# Patient Record
Sex: FEMALE | Race: OTHER | HISPANIC OR LATINO | URBAN - METROPOLITAN AREA
[De-identification: names, ages, dates, MRNs, and addresses within clinical notes are randomized per-mention and may not be internally consistent; named-entity substitution may affect disease eponyms.]

---

## 2023-01-21 ENCOUNTER — EMERGENCY (EMERGENCY)
Facility: HOSPITAL | Age: 23
LOS: 1 days | Discharge: HOME | End: 2023-01-21
Attending: EMERGENCY MEDICINE | Admitting: EMERGENCY MEDICINE
Payer: COMMERCIAL

## 2023-01-21 VITALS
HEART RATE: 94 BPM | TEMPERATURE: 98 F | RESPIRATION RATE: 18 BRPM | DIASTOLIC BLOOD PRESSURE: 100 MMHG | SYSTOLIC BLOOD PRESSURE: 182 MMHG | OXYGEN SATURATION: 100 %

## 2023-01-21 DIAGNOSIS — I10 ESSENTIAL (PRIMARY) HYPERTENSION: ICD-10-CM

## 2023-01-21 DIAGNOSIS — E03.8 OTHER SPECIFIED HYPOTHYROIDISM: ICD-10-CM

## 2023-01-21 LAB
ALBUMIN SERPL ELPH-MCNC: 4.6 G/DL — SIGNIFICANT CHANGE UP (ref 3.3–5)
ALP SERPL-CCNC: 124 U/L — HIGH (ref 40–120)
ALT FLD-CCNC: 13 U/L — SIGNIFICANT CHANGE UP (ref 4–33)
ANION GAP SERPL CALC-SCNC: 20 MMOL/L — HIGH (ref 7–14)
AST SERPL-CCNC: 27 U/L — SIGNIFICANT CHANGE UP (ref 4–32)
BASOPHILS # BLD AUTO: 0.05 K/UL — SIGNIFICANT CHANGE UP (ref 0–0.2)
BASOPHILS NFR BLD AUTO: 0.5 % — SIGNIFICANT CHANGE UP (ref 0–2)
BILIRUB SERPL-MCNC: <0.2 MG/DL — SIGNIFICANT CHANGE UP (ref 0.2–1.2)
BUN SERPL-MCNC: 14 MG/DL — SIGNIFICANT CHANGE UP (ref 7–23)
CALCIUM SERPL-MCNC: 9.8 MG/DL — SIGNIFICANT CHANGE UP (ref 8.4–10.5)
CHLORIDE SERPL-SCNC: 104 MMOL/L — SIGNIFICANT CHANGE UP (ref 98–107)
CO2 SERPL-SCNC: 14 MMOL/L — LOW (ref 22–31)
CREAT SERPL-MCNC: 0.52 MG/DL — SIGNIFICANT CHANGE UP (ref 0.5–1.3)
D DIMER BLD IA.RAPID-MCNC: <150 NG/ML DDU — SIGNIFICANT CHANGE UP
EGFR: 134 ML/MIN/1.73M2 — SIGNIFICANT CHANGE UP
EOSINOPHIL # BLD AUTO: 0.13 K/UL — SIGNIFICANT CHANGE UP (ref 0–0.5)
EOSINOPHIL NFR BLD AUTO: 1.4 % — SIGNIFICANT CHANGE UP (ref 0–6)
FLUAV AG NPH QL: SIGNIFICANT CHANGE UP
FLUBV AG NPH QL: SIGNIFICANT CHANGE UP
GLUCOSE SERPL-MCNC: 100 MG/DL — HIGH (ref 70–99)
HCG SERPL-ACNC: <5 MIU/ML — SIGNIFICANT CHANGE UP
HCT VFR BLD CALC: 43 % — SIGNIFICANT CHANGE UP (ref 34.5–45)
HGB BLD-MCNC: 14 G/DL — SIGNIFICANT CHANGE UP (ref 11.5–15.5)
IANC: 5.91 K/UL — SIGNIFICANT CHANGE UP (ref 1.8–7.4)
IMM GRANULOCYTES NFR BLD AUTO: 0.5 % — SIGNIFICANT CHANGE UP (ref 0–0.9)
LYMPHOCYTES # BLD AUTO: 2.45 K/UL — SIGNIFICANT CHANGE UP (ref 1–3.3)
LYMPHOCYTES # BLD AUTO: 26.8 % — SIGNIFICANT CHANGE UP (ref 13–44)
MCHC RBC-ENTMCNC: 27.4 PG — SIGNIFICANT CHANGE UP (ref 27–34)
MCHC RBC-ENTMCNC: 32.6 GM/DL — SIGNIFICANT CHANGE UP (ref 32–36)
MCV RBC AUTO: 84.1 FL — SIGNIFICANT CHANGE UP (ref 80–100)
MONOCYTES # BLD AUTO: 0.55 K/UL — SIGNIFICANT CHANGE UP (ref 0–0.9)
MONOCYTES NFR BLD AUTO: 6 % — SIGNIFICANT CHANGE UP (ref 2–14)
NEUTROPHILS # BLD AUTO: 5.91 K/UL — SIGNIFICANT CHANGE UP (ref 1.8–7.4)
NEUTROPHILS NFR BLD AUTO: 64.8 % — SIGNIFICANT CHANGE UP (ref 43–77)
NRBC # BLD: 0 /100 WBCS — SIGNIFICANT CHANGE UP (ref 0–0)
NRBC # FLD: 0 K/UL — SIGNIFICANT CHANGE UP (ref 0–0)
PLATELET # BLD AUTO: 358 K/UL — SIGNIFICANT CHANGE UP (ref 150–400)
POTASSIUM SERPL-MCNC: 4.7 MMOL/L — SIGNIFICANT CHANGE UP (ref 3.5–5.3)
POTASSIUM SERPL-SCNC: 4.7 MMOL/L — SIGNIFICANT CHANGE UP (ref 3.5–5.3)
PROT SERPL-MCNC: 8.6 G/DL — HIGH (ref 6–8.3)
RBC # BLD: 5.11 M/UL — SIGNIFICANT CHANGE UP (ref 3.8–5.2)
RBC # FLD: 12.5 % — SIGNIFICANT CHANGE UP (ref 10.3–14.5)
RSV RNA NPH QL NAA+NON-PROBE: SIGNIFICANT CHANGE UP
SARS-COV-2 RNA SPEC QL NAA+PROBE: SIGNIFICANT CHANGE UP
SODIUM SERPL-SCNC: 138 MMOL/L — SIGNIFICANT CHANGE UP (ref 135–145)
TSH SERPL-MCNC: 6.05 UIU/ML — HIGH (ref 0.27–4.2)
WBC # BLD: 9.14 K/UL — SIGNIFICANT CHANGE UP (ref 3.8–10.5)
WBC # FLD AUTO: 9.14 K/UL — SIGNIFICANT CHANGE UP (ref 3.8–10.5)

## 2023-01-21 PROCEDURE — 99204 OFFICE O/P NEW MOD 45 MIN: CPT

## 2023-01-21 PROCEDURE — 99223 1ST HOSP IP/OBS HIGH 75: CPT

## 2023-01-21 PROCEDURE — 71046 X-RAY EXAM CHEST 2 VIEWS: CPT | Mod: 26

## 2023-01-21 RX ORDER — SODIUM CHLORIDE 9 MG/ML
1000 INJECTION INTRAMUSCULAR; INTRAVENOUS; SUBCUTANEOUS ONCE
Refills: 0 | Status: COMPLETED | OUTPATIENT
Start: 2023-01-21 | End: 2023-01-21

## 2023-01-21 RX ORDER — ATENOLOL 25 MG/1
25 TABLET ORAL ONCE
Refills: 0 | Status: COMPLETED | OUTPATIENT
Start: 2023-01-21 | End: 2023-01-21

## 2023-01-21 RX ORDER — ALBUTEROL 90 UG/1
2.5 AEROSOL, METERED ORAL ONCE
Refills: 0 | Status: COMPLETED | OUTPATIENT
Start: 2023-01-21 | End: 2023-01-21

## 2023-01-21 RX ORDER — DIAZEPAM 5 MG
5 TABLET ORAL ONCE
Refills: 0 | Status: DISCONTINUED | OUTPATIENT
Start: 2023-01-21 | End: 2023-01-21

## 2023-01-21 RX ADMIN — SODIUM CHLORIDE 1000 MILLILITER(S): 9 INJECTION INTRAMUSCULAR; INTRAVENOUS; SUBCUTANEOUS at 07:47

## 2023-01-21 RX ADMIN — ALBUTEROL 2.5 MILLIGRAM(S): 90 AEROSOL, METERED ORAL at 07:47

## 2023-01-21 RX ADMIN — Medication 5 MILLIGRAM(S): at 11:34

## 2023-01-21 RX ADMIN — ATENOLOL 25 MILLIGRAM(S): 25 TABLET ORAL at 11:34

## 2023-01-21 NOTE — CONSULT NOTE ADULT - ASSESSMENT
A/P: 23 yo F with no known PMH with presentation above. Endocrine called regarding HTN and abnormal TFTs.    #HTN: in young woman, no PMH prior, on OCP. +fam hx in father  - team ordering plasma metanephrines - can follow up outpatient, was given benzo will likely need to be repeated if abnormal  - can also check aldosterone and plasma renin activity  - no hypokalemia  - low suspicion for endocrinopathy based on available data but can follow up with endocrinology and PCP as outpatient  - pcp for HTN monitoring, consider sending BP cuff on discharge for home monitoring for pt to record    #subclinical hypothyroidism  - can check TPO antibody, can follow up as outpt  - no indication to treat at this time  - repeat TFTs as outpatient    Pt can follow up with endocrine  provided pcp contact as well    [facility]:  [address] [place] [address]. [address]. [place] [address]  Tel: [phone]      PATIENT ACCESS SERVICES  [phone]  For all [facility] Endocrine practices phone numbers and locations throughout [place], [place], and [place].    discussed with cdu  endocrine will sign off    Lily Hall MD  Attending Physician   Department of Endocrinology, Diabetes and Metabolism   [employer] Teams on 01-21-23 @ 15:49    If before 9AM or after 5PM, or on weekends/holidays, please call the Endocrine answering service for assistance ([phone]).  For nonurgent matters, please email [email or web address] for assistance.

## 2023-01-21 NOTE — ED ADULT TRIAGE NOTE - CHIEF COMPLAINT QUOTE
c/o SOB, chest tightness, and palpitations since 2am. pt arrives upset/in tears, endorses tingling in fingers. NO PMHx

## 2023-01-21 NOTE — ED CDU PROVIDER INITIAL DAY NOTE - CLINICAL SUMMARY MEDICAL DECISION MAKING FREE TEXT BOX
22-year-old female with no significant past medical history presents emergency room for shortness of breath and palpitations.  Patient reports sudden onset of difficulty breathing and heart racing that began at 2 AM.  Denies any alcohol, smoking or drug use prior to symptom onset.  States that she does have some anxiety but denies any particular triggers.  Denies fever, chills, URI symptoms, cough, chest pain, abdominal pain, nausea, vomiting, diarrhea.  Patient reports some numbness and tingling in her fingers at that time but denies the symptoms now.  In the ER, patient found to be tachycardic into the 120s and hypertensive systolic of 180s.  CBC unremarkable, D-dimer negative, CMP unremarkable, hCG negative, TSH found to be elevated, T3 also elevated, T4 and free T4 are normal.  Viral swab negative chest x-ray clear.  EKG was normal sinus rhythm at 99 bpm without ischemic changes.  Given Atenolol and Valium in ER, HR in the 70s, BP systolic of 150s now. Plan for CDU for telemetric monitoring, EP consultation for possible causes of tachycardia and renal artery ultrasound for evaluation of hypertension.

## 2023-01-21 NOTE — ED CDU PROVIDER INITIAL DAY NOTE - NS ED ATTENDING STATEMENT MOD
This was a shared visit with the TIFFANI. I reviewed and verified the documentation and independently performed the documented:

## 2023-01-21 NOTE — ED CDU PROVIDER INITIAL DAY NOTE - OBJECTIVE STATEMENT
22-year-old female with no significant past medical history presents emergency room for shortness of breath and palpitations.  Patient reports sudden onset of difficulty breathing and heart racing that began at 2 AM.  Denies any alcohol, smoking or drug use prior to symptom onset.  States that she does have some anxiety but denies any particular triggers.  Denies fever, chills, URI symptoms, cough, chest pain, abdominal pain, nausea, vomiting, diarrhea.  Patient reports some numbness and tingling in her fingers at that time but denies the symptoms now.  In the ER, patient found to be tachycardic into the 120s and hypertensive systolic of 180s.  CBC unremarkable, D-dimer negative, CMP unremarkable, hCG negative, TSH found to be elevated, T3 also elevated, T4 and free T4 are normal.  Viral swab negative chest x-ray clear.  EKG was normal sinus rhythm at 99 bpm without ischemic changes. Patient reports improvement of symptoms overall but still mild shortness of breath/difficulty catching her breath.

## 2023-01-21 NOTE — ED PROVIDER NOTE - OBJECTIVE STATEMENT
Tracey Robledo MD attending physician.Patient is a 22-year-old woman otherwise healthy denies any past medical history works for [employer].  She had a sudden onset of shortness of breath and palpitations last night at about 2 AM.  She denies any weight loss tremor any alcohol last night or smoking.  She denies any other medical problems although has not been to a doctor in a long time.  She uses birth control but does not get her menses.  So she is unclear when she would have last ovulated.  She denies abdominal pain nausea vomiting diarrhea URI symptoms.  She denies fever.  She had a sense of palpitations that she still has.  At this moment her EKG is normal sinus rhythm rate of 99 without any short MS long QT or Brugada signs.  When I listen to her heart her heart rate beba to 122.  She also had a sense of numbness and tingling in her fingers although no perioral sensation.  No numbness and tingling in her toes.  Her partner acknowledges that baseline she may have a slightly high anxiety level.  She does not identify any particular triggers.  She denies a history of asthma or thyroid disease.  She denies baseline alcohol abuse or history of withdrawal

## 2023-01-21 NOTE — ED ADULT NURSE NOTE - OBJECTIVE STATEMENT
Patient came in with the complaints of chest tightness started 2 am this morning. No other complaints. Specimens collected and sent. Medications given as ordered. Patient tolerated well. Nursing care continues

## 2023-01-21 NOTE — ED PROVIDER NOTE - PROGRESS NOTE DETAILS
pt on monitor in hallway.  hr consistently near or about 100, rate in 120's much of the tuime, even after hydration.  also note bp consistently elevated.  d dimr normal  less likely PE but vs abnl, endocrine paged.  pulse up to 140 despite hydration, sinus,  tsh elevated,  will stay in obs, given atenalol and valium pt hypothyroid, does not explain tachycardia htn.  would send metanephrine, raheem, renin, and get renal U\artery US, and have EP eval pt in OBS, consider for arranging holter, etc

## 2023-01-21 NOTE — ED PROVIDER NOTE - PHYSICAL EXAMINATION
Pt alert and can phonate well  h at/nc  perrl, conj clear, sclera anicteric,  neck supple  cor rrr pos s1s2, rate 120 on asc  lungs clear to asno wheeze  abd soft no r/g/t  ext no edema no deformities  neueo awake, lucid normal gait moves all extremities with strength  psych mildly anxious  vs bp 180/100

## 2023-01-21 NOTE — ED PROVIDER NOTE - EKG ADDITIONAL INFORMATION FREE TEXT
At this moment her EKG is normal sinus rhythm rate of 99 without any short CA long QT or Brugada signs.  No signs of ischemia

## 2023-01-21 NOTE — ED PROVIDER NOTE - CLINICAL SUMMARY MEDICAL DECISION MAKING FREE TEXT BOX
Tracey Robledo MD attending physician.  See my H&P above.  Patient with sudden onset of tachycardia and shortness of breath that she identifies at 2:00 last night.  Has a difficult and stressful job but does not note any additional stressors.  Partner agrees that she has baseline stress.  Denies weight loss tremor but considering hyperthyroidism.  Had a recent trip to [place] so considering pulmonary embolism although now not tachycardic.  We do a D-dimer and chest x-ray to evaluate for pulmonary issues.  We will treat with albuterol once and see if it improves though the patient is not wheezing.  There is a very low probability that she has SVT as she currently feels symptoms even though her heart rate is currently normal.  Of note her blood pressure was 180/100 on presentation.  We will recheck that as well.

## 2023-01-21 NOTE — CONSULT NOTE ADULT - SUBJECTIVE AND OBJECTIVE BOX
HPI:  · HPI Objective Statement: 22-year-old female with no significant past medical history presents emergency room for shortness of breath and palpitations.  Patient reports sudden onset of difficulty breathing and heart racing that began at 2 AM.  Denies any alcohol, smoking or drug use prior to symptom onset.  States that she does have some anxiety but denies any particular triggers.  Denies fever, chills, URI symptoms, cough, chest pain, abdominal pain, nausea, vomiting, diarrhea.  Patient reports some numbness and tingling in her fingers at that time but denies the symptoms now.  In the ER, patient found to be tachycardic into the 120s and hypertensive systolic of 180s.  CBC unremarkable, D-dimer negative, CMP unremarkable, hCG negative, TSH found to be elevated, T3 also elevated, T4 and free T4 are normal.  Viral swab negative chest x-ray clear.  EKG was normal sinus rhythm at 99 bpm without ischemic changes. Patient reports improvement of symptoms overall but still mild shortness of breath/difficulty catching her breath.    ENDOCRINE CONSULT  hpi: 21 yo F with no known PMH with presentation above. Endocrine called regarding HTN and abnormal TFTs.    Pt denies known prior history of HTN or thyroid disease. Reports years of palpitations, possibly worse in last year, associated with SOB and headaches with migrainous features - sometimes with photophobia and lasting hours. She is unable to correlate the two symptoms and is unsure if they occur concurrently. Feels like she gets hot flashes. She does not taking any meds besides an OCP. Reports fam hx of HTN in her father, CVA in older family members. She reports her diet is poor and she has a stressful job with [employer].    TSH 6.05  FT4 normal 1.4    s/p diazepam in ED and atenolol    systolic bps up to 180s per chart, HR 120s   pt appearing clinically well at time of visit but very tearful      PAST MEDICAL & SURGICAL HISTORY:  No pertinent past medical history          FAMILY HISTORY:  + HTN in father    Social History:  denies tobacco, etoh or drug use    Outpatient Medications: OCP, unsure of name    MEDICATIONS  (STANDING):    MEDICATIONS  (PRN):      Allergies    No Known Allergies    Intolerances      Review of Systems:  Constitutional: No fever, good appetite/po intake  Eyes: No blurry vision, diplopia  Neuro: No tremors  HEENT: No pain  Cardiovascular: No chest pain, palpitations  Respiratory: No SOB, no cough  GI: No nausea, vomiting,   : No dysuria, hematuria  Skin: no rash  Psych: no depression  Endocrine: no polyuria, polydipsia  Hem/lymph: no swelling  Osteoporosis: no fractures    ALL OTHER SYSTEMS REVIEWED AND NEGATIVE    PHYSICAL EXAM:  VITALS: T(C): 36.9 (01-21-23 @ 13:45)  T(F): 98.5 (01-21-23 @ 13:45), Max: 98.9 (01-21-23 @ 13:09)  HR: 74 (01-21-23 @ 13:45) (74 - 94)  BP: 159/99 (01-21-23 @ 13:45) (132/92 - 182/100)  RR:  (18 - 20)  SpO2:  (98% - 100%)  Wt(kg): --  GENERAL: NAD, well-groomed, well-developed  EYES: No proptosis, no lid lag, anicteric  HEENT:  Atraumatic, Normocephalic, moist mucous membranes  THYROID: Normal size, no palpable nodules  RESPIRATORY: Clear to auscultation bilaterally; No rales, rhonchi, wheezing, or rubs  CARDIOVASCULAR: Regular rate and rhythm; No murmurs; no peripheral edema  GI: Soft, nontender, non distended, normal bowel sounds  SKIN: Dry, intact, No rashes or lesions  NEURO: sensation intact, extraocular movements intact, no tremor, normal reflexes  PSYCH: reactive affect, euthymic mood, tearful    POCT Blood Glucose.: 100 mg/dL (01-21-23 @ 13:43)                            14.0   9.14  )-----------( 358      ( 21 Jan 2023 08:00 )             43.0       01-21    138  |  104  |  14  ----------------------------<  100<H>  4.7   |  14<L>  |  0.52    eGFR: 134    Ca    9.8      01-21    TPro  8.6<H>  /  Alb  4.6  /  TBili  <0.2  /  DBili  x   /  AST  27  /  ALT  13  /  AlkPhos  124<H>  01-21      Thyroid Function Tests:  01-21 @ 08:00 TSH 6.05 FreeT4 1.4 T3 235 Anti TPO -- Anti Thyroglobulin Ab -- TSI --              Radiology:

## 2023-01-21 NOTE — ED CDU PROVIDER INITIAL DAY NOTE - ATTENDING APP SHARED VISIT CONTRIBUTION OF CARE
Tracey Robledo MD attending physician.  See my H&P above.  Patient with sudden onset of tachycardia and shortness of breath that she identifies at 2:00 last night.  Has a difficult and stressful job but does not note any additional stressors.  Partner agrees that she has baseline stress.  Denies weight loss tremor but considering hyperthyroidism.  Had a recent trip to [place] so considering pulmonary embolism although now not tachycardic.  We do a D-dimer and chest x-ray to evaluate for pulmonary issues.  We will treat with albuterol once and see if it improves though the patient is not wheezing.   though her heart rate is currently normal.  Of note her blood pressure was 180/100 on presentation.  We will recheck that as well.    d dimer was neg, pt with persistent htn, tachycardia, sending for metanephrines, renin and raheem, but will keep in cdu for eval for ep/ cards, hr consistently in 120's-140's even post hydration.  given bblocker and valium here    I performed a history and physical exam of the patient and discussed their management with the resident and /or advanced care provider. I reviewed the resident and /or ACP's note and agree with the documented findings and plan of care. My medical decison making and observations are found above.

## 2023-01-22 VITALS
SYSTOLIC BLOOD PRESSURE: 131 MMHG | TEMPERATURE: 98 F | DIASTOLIC BLOOD PRESSURE: 75 MMHG | OXYGEN SATURATION: 100 % | RESPIRATION RATE: 18 BRPM | HEART RATE: 88 BPM

## 2023-01-22 PROCEDURE — 99239 HOSP IP/OBS DSCHRG MGMT >30: CPT

## 2023-01-22 PROCEDURE — 93306 TTE W/DOPPLER COMPLETE: CPT | Mod: 26

## 2023-01-22 NOTE — ED CDU PROVIDER DISPOSITION NOTE - NSFOLLOWUPINSTRUCTIONS_ED_ALL_ED_FT
Follow up with cardiology (referral list provided or you may call [phone] to schedule an appointment ASAP. Most patients can be seen within 48 hours. Mention that you were just discharged from the emergency room and need to be seen immediately.; or you may call [phone] to make an appointment with the cardiology clinic)    Follow up with endocrinology (call [phone] to make an appointment) as soon as possible.    Follow up with your primary care physician in 48-72 hours- bring copies of your results.  Return to the ER for worsening or persistent symptoms, and/or ANY NEW OR CONCERNING SYMPTOMS: worsening palpitations, fever, difficulty breathing, chest pain. If you have issues obtaining follow up, please call: [phone] to obtain a doctor or specialist who takes your insurance in your area.  You may call [phone] to make an appointment with the internal medicine clinic.

## 2023-01-22 NOTE — ED CDU PROVIDER DISPOSITION NOTE - PATIENT PORTAL LINK FT
You can access the FollowMyHealth Patient Portal offered by [facility] by registering at the following website: [email or web address]. By joining [facility]s FollowMyHealth portal, you will also be able to view your health information using other applications (apps) compatible with our system.

## 2023-01-22 NOTE — ED CDU PROVIDER DISPOSITION NOTE - NSFOLLOWUPCLINICS_GEN_ALL_ED_FT
[facility]  Endocrinology  [address] [place] [address]  Phone: [phone]  Fax:     [facility]  Cardiology  [address] [place] [id number]  Phone: [phone]  Fax:

## 2023-01-22 NOTE — ED CDU PROVIDER SUBSEQUENT DAY NOTE - HISTORY
23 yo female, no stated PMH, presented to the ED c/o shortness of breath and palpitations, onset 1/21/23 at 0200 AM; also had b/l UE hand paresthesias at the time.  Pt denied any alcohol, smoking or drug use prior to symptom onset; stated that she does have some anxiety but denies any particular triggers.  ROS otherwise negative.  In the ED, patient was (sinus) tachycardic into the 120s and hypertensive systolic of 180s.  CBC unremarkable, D-dimer negative, CMP unremarkable, hCG negative, TSH found to be elevated, T3 also elevated, T4 and free T4 are normal.  Viral swab negative chest x-ray clear.  EKG later was normal sinus rhythm at 99 bpm without ischemic changes.  Given Atenolol and Valium in ER, VS improved: HR in the 70s, BP systolic of 150s. Plan for CDU for telemetry monitoring, EP consultation for possible causes of tachycardia and renal artery ultrasound for evaluation of hypertension.  Echo ordered as well.  In the interim, pt objectively noted to be resting comfortably; pt has been clinically stable; no issues thus far.  Current HR NSR upper 60's - low 70's on cardiac monitor.

## 2023-01-22 NOTE — ED CDU PROVIDER SUBSEQUENT DAY NOTE - ATTENDING APP SHARED VISIT CONTRIBUTION OF CARE
CDU MD YODER:  I performed a face to face bedside interview with patient regarding history of present illness, review of symptoms and past medical history. I completed an independent physical exam.  I have discussed patient's plan of care with PA.   I agree with note as stated above, having amended the EMR as needed to reflect my findings. I have discussed the assessment and plan of care.  This includes during the time I functioned as the attending physician for this patient.

## 2023-01-22 NOTE — ED CDU PROVIDER SUBSEQUENT DAY NOTE - CLINICAL SUMMARY MEDICAL DECISION MAKING FREE TEXT BOX
Tele monitoring, EP consultation, renal artery ultrasound, echo, general observation care / monitoring.

## 2023-01-22 NOTE — ED CDU PROVIDER DISPOSITION NOTE - CLINICAL COURSE
21 yo female, no stated PMH, presented to the ED c/o shortness of breath and palpitations, onset 1/21/23 at 0200 AM; also had b/l UE hand paresthesias at the time.  Pt denied any alcohol, smoking or drug use prior to symptom onset; stated that she does have some anxiety but denies any particular triggers.  ROS otherwise negative.  In the ED, patient was (sinus) tachycardic into the 120s and hypertensive systolic of 180s.  CBC unremarkable, D-dimer negative, CMP unremarkable, hCG negative, TSH found to be elevated, T3 also elevated, T4 and free T4 are normal.  Viral swab negative chest x-ray clear.  EKG later was normal sinus rhythm at 99 bpm without ischemic changes.  Given Atenolol and Valium in ER, VS improved: HR in the 70s, BP systolic of 150s. Plan for CDU for telemetry monitoring, EP consultation for possible causes of tachycardia and renal artery ultrasound for evaluation of hypertension.  Echo ordered as well.  In the interim, pt objectively noted to be resting comfortably; pt has been clinically stable; no issues thus far.  Current HR NSR upper 60's - low 70's on cardiac monitor.  re-evaluated pt in the morning, pt resting comfortably in stretcher. echo normal. bp 120-130/80-90 without medications here in the CDU. discussed with house cards in the morning who states that no need for emergent consult at this time and have pt f/u outpatient.   pt did not want to wait for the US of renal artery, and states that she will follow up with PCP and endo and cards outpatient.

## 2023-01-22 NOTE — ED CDU PROVIDER SUBSEQUENT DAY NOTE - PROGRESS NOTE DETAILS
Pt resting comfortably.  No complaints.  No cp or palpitations.  BP wnl.  Pending u/s studies and ep eval.  Pt agrees with plan. re-evaluated pt in the morning, pt resting comfortably in stretcher. echo normal. bp 120-130/80-90 without medications here in the CDU. discussed with house cards in the morning who states that no need for emergent consult at this time and have pt f/u outpatient.   pt did not want to wait for the US of renal artery, and states that she will follow up with PCP and endo and cards outpatient.

## 2023-01-23 ENCOUNTER — NON-APPOINTMENT (OUTPATIENT)
Age: 23
End: 2023-01-23

## 2023-01-23 PROBLEM — Z78.9 OTHER SPECIFIED HEALTH STATUS: Chronic | Status: ACTIVE | Noted: 2023-01-21

## 2023-01-23 PROBLEM — Z00.00 ENCOUNTER FOR PREVENTIVE HEALTH EXAMINATION: Status: ACTIVE | Noted: 2023-01-23

## 2023-01-23 LAB — ALDOST SERPL-MCNC: 5.5 NG/DL — SIGNIFICANT CHANGE UP

## 2023-01-25 ENCOUNTER — TRANSCRIPTION ENCOUNTER (OUTPATIENT)
Age: 23
End: 2023-01-25

## 2023-01-26 ENCOUNTER — APPOINTMENT (OUTPATIENT)
Dept: INTERNAL MEDICINE | Facility: CLINIC | Age: 23
End: 2023-01-26
Payer: COMMERCIAL

## 2023-01-26 LAB — RENIN PLAS-CCNC: 1.82 NG/ML/HR — SIGNIFICANT CHANGE UP (ref 0.17–5.38)

## 2023-01-26 PROCEDURE — 99213 OFFICE O/P EST LOW 20 MIN: CPT | Mod: 95

## 2023-01-28 LAB
METANEPHRINE, PL: 11.9 PG/ML — SIGNIFICANT CHANGE UP (ref 0–88)
NORMETANEPHRINE, PL: 39.2 PG/ML — SIGNIFICANT CHANGE UP (ref 0–210.1)

## 2023-01-29 NOTE — PHYSICAL EXAM
[No Acute Distress] : no acute distress [No Respiratory Distress] : no respiratory distress  [No Accessory Muscle Use] : no accessory muscle use [Normal Affect] : the affect was normal [Alert and Oriented x3] : oriented to person, place, and time

## 2023-01-29 NOTE — HISTORY OF PRESENT ILLNESS
[Home] : at home, [unfilled] , at the time of the visit. [Medical Office: ([facility])___] : at the medical office located in  [Verbal consent obtained from patient] : the patient, [unfilled] [FreeTextEntry8] : \par 23 yo F calling in on video to request a work note for time off due to panic attack symptoms. She was feeling SOB with lightheadedness and went to the ER. Workup negative but returned to work with same symptoms. She left work early and has been out of work since. She plans to stay at home until Tuesday and follow up with another PCP, Dr. Jordan as well.

## 2023-01-30 ENCOUNTER — APPOINTMENT (OUTPATIENT)
Dept: INTERNAL MEDICINE | Facility: CLINIC | Age: 23
End: 2023-01-30

## 2023-02-06 ENCOUNTER — NON-APPOINTMENT (OUTPATIENT)
Age: 23
End: 2023-02-06

## 2023-02-06 ENCOUNTER — APPOINTMENT (OUTPATIENT)
Dept: INTERNAL MEDICINE | Facility: CLINIC | Age: 23
End: 2023-02-06
Payer: COMMERCIAL

## 2023-02-06 VITALS
WEIGHT: 185 LBS | RESPIRATION RATE: 18 BRPM | DIASTOLIC BLOOD PRESSURE: 82 MMHG | SYSTOLIC BLOOD PRESSURE: 120 MMHG | TEMPERATURE: 98.9 F | BODY MASS INDEX: 32.78 KG/M2 | OXYGEN SATURATION: 99 % | HEIGHT: 63 IN | HEART RATE: 110 BPM

## 2023-02-06 DIAGNOSIS — Z00.00 ENCOUNTER FOR GENERAL ADULT MEDICAL EXAMINATION W/OUT ABNORMAL FINDINGS: ICD-10-CM

## 2023-02-06 DIAGNOSIS — F41.0 PANIC DISORDER [EPISODIC PAROXYSMAL ANXIETY]: ICD-10-CM

## 2023-02-06 PROCEDURE — 99203 OFFICE O/P NEW LOW 30 MIN: CPT | Mod: 25

## 2023-02-06 PROCEDURE — 99385 PREV VISIT NEW AGE 18-39: CPT | Mod: 25

## 2023-02-06 PROCEDURE — G0447 BEHAVIOR COUNSEL OBESITY 15M: CPT

## 2023-02-07 ENCOUNTER — APPOINTMENT (OUTPATIENT)
Dept: FAMILY MEDICINE | Facility: CLINIC | Age: 23
End: 2023-02-07

## 2023-02-08 PROBLEM — F41.0 ANXIETY ATTACK: Status: ACTIVE | Noted: 2023-01-26

## 2023-02-08 PROBLEM — Z00.00 PREVENTATIVE HEALTH CARE: Status: ACTIVE | Noted: 2023-02-08

## 2023-02-13 ENCOUNTER — APPOINTMENT (OUTPATIENT)
Dept: PHYSICAL MEDICINE AND REHAB | Facility: CLINIC | Age: 23
End: 2023-02-13

## 2023-02-14 NOTE — REASON FOR VISIT
Outpatient Medications Marked as Taking for the 10/29/19 encounter (Refill) with Anant Rose, DO   Medication Sig Dispense Refill   • ascorbic acid (VITAMIN C) 250 MG tablet Take 1 tablet by mouth daily. 31 tablet 5        Ok to leave detailed Message: Yes and No  Informed caller of refill policy- 24-48 hours: No  No call back needed unless nurse has questions.     Pharmacy: [facility]  
Will refill Vitamin C 250mg daily x 6 mos.  Last office visit: 9/9/19  Next visit:  12/12/19  
[Annual Wellness Visit] : an annual wellness visit

## 2023-02-14 NOTE — HISTORY OF PRESENT ILLNESS
[FreeTextEntry1] : annual wellness/ER discharge follow up visit  [de-identified] : 23 y/o F presents for an annual wellness visit/ER discharge follow up visit. She was admitted to the hospital for an anxiety attack, SOB, palpitations. Of note, she has FHx of heart disease and her hospital labs showed mild TSH elevation. Patient reports having chronic migraines. She feels otherwise well and reports no other acute complaints or concerns. ROS as documented below.\par \par I Kaila Bancroft am scribing for and in the presence of Dr. Brett Jordan, the following sections: HISTORY OF PRESENT ILLNESS, PAST MEDICAL/FAMILY/SOCIAL HISTORY, REVIEW OF SYSTEMS, PHYSICAL EXAM; DISPOSITION.\par \par I personally performed the services described in the documentation, reviewed the documentation recorded by the scribe in my presence and it accurately and completely records my words and actions.\par \par

## 2023-03-04 ENCOUNTER — NON-APPOINTMENT (OUTPATIENT)
Age: 23
End: 2023-03-04

## 2023-03-06 RX ORDER — SUMATRIPTAN 50 MG/1
50 TABLET, FILM COATED ORAL DAILY
Qty: 5 | Refills: 2 | Status: DISCONTINUED | COMMUNITY
Start: 2023-02-06 | End: 2023-03-06

## 2023-03-07 ENCOUNTER — NON-APPOINTMENT (OUTPATIENT)
Age: 23
End: 2023-03-07

## 2023-04-24 ENCOUNTER — APPOINTMENT (OUTPATIENT)
Dept: ENDOCRINOLOGY | Facility: CLINIC | Age: 23
End: 2023-04-24

## 2023-04-25 ENCOUNTER — RX RENEWAL (OUTPATIENT)
Age: 23
End: 2023-04-25

## 2023-04-26 RX ORDER — HYDROXYZINE HYDROCHLORIDE 50 MG/1
50 TABLET ORAL DAILY
Qty: 30 | Refills: 1 | Status: ACTIVE | COMMUNITY
Start: 2023-02-06 | End: 1900-01-01

## 2024-11-13 NOTE — ED PROVIDER NOTE - SECONDARY DIAGNOSIS.
Attended breastfeeding, postpartum and  care class today. Patientst's questions/concerns regarding breastfeeding, general self and 's care addressed. Educated on Shaken Baby Syndrome and Safe Sleep Practices. Additional community breastfeeding resources given - [facility] Breastfeeding Warmline, [facility] x2 Locations./initiate/review safe skin-to-skin/initiate/review hand expression/initiate/review pumping guidelines and safe milk handling/initiate/review techniques for position and latch/post discharge community resources provided/review techniques to increase milk supply/review techniques to manage sore nipples/engorgement/initiate/review breast massage/compression/reviewed components of an effective feeding and at least 8 effective feedings per day required/reviewed importance of monitoring infant diapers, the breastfeeding log, and minimum output each day/reviewed risks of unnecessary formula supplementation/reviewed strategies to transition to breastfeeding only/reviewed benefits and recommendations for rooming in/reviewed feeding on demand/by cue at least 8 times a day/recommended follow-up with pediatrician within 24 hours of discharge/reviewed indications of inadequate milk transfer that would require supplementation SGA infant. Breastfeeding on cue 8-12X/24 hours with diaper count to assess for adequate intake, safe skin to skin and rooming-in encouraged. Mom requests formula. Explored reason and discussed risks of supplementing while breastfeeding without medical reason. Mom still chooses to supplement with formula. Explained risks of using artificial nipples and discussed options for using alternative feeding methods instead of nipple.  Safe formula preparation and feeding handout provided and discussed./initiate/review safe skin-to-skin/initiate/review hand expression/initiate/review techniques for position and latch/review techniques to increase milk supply/review techniques to manage sore nipples/engorgement/initiate/review breast massage/compression/reviewed components of an effective feeding and at least 8 effective feedings per day required/reviewed importance of monitoring infant diapers, the breastfeeding log, and minimum output each day/reviewed benefits and recommendations for rooming in/reviewed feeding on demand/by cue at least 8 times a day/reviewed indications of inadequate milk transfer that would require supplementation Sinus tachycardia